# Patient Record
Sex: FEMALE | Race: WHITE
[De-identification: names, ages, dates, MRNs, and addresses within clinical notes are randomized per-mention and may not be internally consistent; named-entity substitution may affect disease eponyms.]

---

## 2017-03-07 ENCOUNTER — HOSPITAL ENCOUNTER (OUTPATIENT)
Age: 67
Discharge: HOME | End: 2017-03-07
Payer: MEDICARE

## 2017-03-07 DIAGNOSIS — E78.5: Primary | ICD-10-CM

## 2017-06-23 ENCOUNTER — HOSPITAL ENCOUNTER (OUTPATIENT)
Dept: HOSPITAL 76 - DI | Age: 67
Discharge: HOME | End: 2017-06-23
Attending: PHYSICIAN ASSISTANT
Payer: MEDICARE

## 2017-06-23 DIAGNOSIS — N95.9: Primary | ICD-10-CM

## 2017-06-23 PROCEDURE — 77080 DXA BONE DENSITY AXIAL: CPT

## 2017-06-26 NOTE — DEXA REPORT
DEXA BONE MINERAL DENSITY SCAN: 06/23/2017



CLINICAL HISTORY: A 67-year-old postmenopausal female. 



TECHNIQUE: Dual energy x-ray absorptiometry (DXA) was performed on a [employer] 
system. Regions measured are the AP spine, femoral neck, and, if needed, 
forearm.



COMPARISON: None. In accordance with the International Society for Clinical 
Densitometry (ISCD) guidelines, data from previous exams may be reanalyzed 
using current recommendations and techniques. This is done to allow a more 
accurate basis for comparison with the current study.



FINDINGS: The data for the lumbar spine is as follows:









REGION BMD (g/cm/cm) T-SCORE Z-SCORE

 

L1 1.166 0.3 1.6

 

L2 1.216 0.1 1.4

 

L3 1.343 1.2 2.4

 

L4 1.330 1.1 2.3

 

TOTAL 1.268 0.7 2.0





NOTE: All evaluable vertebrae are used for classification.



The data for the hip is as follows:







REGION BMD (g/cm/cm) T-SCORE Z-SCORE

 

Neck 0.942 -0.7 0.7

 

TOTAL 0.954 -0.4 0.7





NOTE: The femoral neck or total proximal femur, whichever is lowest, is used 
for classification.



IMPRESSION: L1 THROUGH L4 T-SCORE IS 0.7. THIS IS WITHIN NORMAL LIMITS 
ACCORDING TO THE WORLD HEALTH ORGANIZATION GUIDELINES. 



LEFT FEMORAL NECK T-SCORE IS -0.7. THIS IS WITHIN NORMAL LIMITS ACCORDING TO 
THE WORLD HEALTH ORGANIZATION GUIDELINES. 



TOTAL HIPS T-SCORE IS -0.4. THIS IS WITHIN NORMAL LIMITS ACCORDING TO THE WORLD 
HEALTH ORGANIZATION GUIDELINES.



THE WHO CLASSIFICATION BASED ON THE INTERNATIONAL REFERENCE STANDARD IS NORMAL. 
THE PATIENT HAS NO INCREASED FRACTURE RISK. 



RECOMMENDATION: Patients with diagnosis of osteoporosis or osteopenia should 
have regular bone mineral density assessment. For those eligible for Medicare, 
routine testing is allowed once every 2 years. Testing frequency can be 
increased for patients who have rapidly progressing disease or for those who 
are receiving medical therapy to restore bone mass. 



COMMENT: World Health Organization (WHO) definitions for osteoporosis and 
osteopenia:

NORMAL BMD: T-score at -1.0 or higher, fracture risk is low.

OSTEOPENIA BMD: T-score between -1.0 and -2.5, fracture risk is increased.

OSTEOPOROSIS BMD: T-score at -2.5 or lower, fracture risk high.



National Osteoporosis Foundation recommends:

1. Obtain adequate dietary calcium (at least 1200 mg per day) and vitamin D (400
-800 international units per day).

2. Participate, as appropriate, in regular weightbearing and muscle-
strengthening exercise. 

3. Avoid tobacco use and reduce alcohol and caffeine intake. 

4. For more detailed information see the website at www.NOF.org.
MTDD

## 2017-07-19 ENCOUNTER — HOSPITAL ENCOUNTER (OUTPATIENT)
Dept: HOSPITAL 76 - DI | Age: 67
Discharge: HOME | End: 2017-07-19
Attending: PHYSICIAN ASSISTANT
Payer: MEDICARE

## 2017-07-19 DIAGNOSIS — Z12.31: Primary | ICD-10-CM

## 2017-07-19 PROCEDURE — 77067 SCR MAMMO BI INCL CAD: CPT

## 2017-07-21 NOTE — MAMMOGRAPHY REPORT
DIGITAL BILATERAL SCREENING MAMMOGRAM:  07/19/2017

 

CLINICAL HISTORY:  A 67-year-old female who has no family history of breast cancer.  Patient has had 
no breast surgical history.

 

COMPARISON:  07/20/2007, 07/06/2011, 10/05/2012, 06/19/2015, 06/13/2016

 

TECHNIQUE:  Craniocaudad and oblique lateral views of each breast were obtained with [employer] Full [employer]ie
ld digital mammography.  

 

FINDINGS:  Breast parenchyma consists of scattered fibroglandular densities. 

 

No significant clusters of calcification are seen.  No significant masses are noted.  No significant 
change is seen.  Benign appearing lymph nodes are noted in each axillary region. 

 

IMPRESSION:  BREASTS APPEAR RADIOGRAPHICALLY BENIGN.

 

BIRADS CATEGORY 1 - NEGATIVE.

 

RECOMMENDATIONS:  Annual bilateral screening mammography.

 

STANDARD QUALIFYING STATEMENTS

 

1. This examination was reviewed with the aid of Computer-Aided Detection (CAD).

 

2. A negative or benign imaging report should not delay biopsy if clinically suspicious findings are 
present. Consider surgical consultation if warranted. More than 5% of cancers are not identified by i
maging.

 

3. Dense breasts may obscure an underlying neoplasm.

 

 

 

 

 

DD:07/20/2017 08:00:19  DT: 07/21/2017 07:46  JOB #: [id number]  EXT JOB #:[id number]

## 2017-12-07 ENCOUNTER — HOSPITAL ENCOUNTER (OUTPATIENT)
Dept: HOSPITAL 76 - LAB | Age: 67
Discharge: HOME | End: 2017-12-07
Attending: PHYSICIAN ASSISTANT
Payer: MEDICARE

## 2017-12-07 DIAGNOSIS — M06.9: ICD-10-CM

## 2017-12-07 DIAGNOSIS — R53.83: ICD-10-CM

## 2017-12-07 DIAGNOSIS — E78.5: Primary | ICD-10-CM

## 2017-12-07 LAB
ALBUMIN/GLOB SERPL: 1.3 {RATIO} (ref 1–2.2)
ANION GAP SERPL CALCULATED.4IONS-SCNC: 11 MMOL/L (ref 6–13)
BASOPHILS NFR BLD AUTO: 0 10^3/UL (ref 0–0.1)
BASOPHILS NFR BLD AUTO: 0.3 %
BILIRUB BLD-MCNC: 0.4 MG/DL (ref 0.2–1)
BUN SERPL-MCNC: 9 MG/DL (ref 6–20)
CALCIUM UR-MCNC: 9 MG/DL (ref 8.5–10.3)
CHLORIDE SERPL-SCNC: 103 MMOL/L (ref 101–111)
CHOLEST SERPL-MCNC: 130 MG/DL
CO2 SERPL-SCNC: 25 MMOL/L (ref 21–32)
CREAT SERPLBLD-SCNC: 0.7 MG/DL (ref 0.4–1)
EOSINOPHIL # BLD AUTO: 0 10^3/UL (ref 0–0.7)
EOSINOPHIL NFR BLD AUTO: 0 %
ERYTHROCYTE [DISTWIDTH] IN BLOOD BY AUTOMATED COUNT: 13.7 % (ref 12–15)
GFRSERPLBLD MDRD-ARVRAT: 83 ML/MIN/{1.73_M2} (ref 89–?)
GLOBULIN SER-MCNC: 3.2 G/DL (ref 2.1–4.2)
GLUCOSE SERPL-MCNC: 95 MG/DL (ref 70–100)
HCT VFR BLD AUTO: 36.9 % (ref 37–47)
HDLC SERPL-MCNC: 29 MG/DL
HDLC SERPL: 4.5 {RATIO} (ref ?–4.4)
HGB UR QL STRIP: 12.3 G/DL (ref 12–16)
LDLC/HDLC SERPL: 2.7 {RATIO} (ref ?–4.4)
LYMPHOCYTES # SPEC AUTO: 1.2 10^3/UL (ref 1.5–3.5)
LYMPHOCYTES NFR BLD AUTO: 35.9 %
MCH RBC QN AUTO: 30.3 PG (ref 27–31)
MCHC RBC AUTO-ENTMCNC: 33.4 G/DL (ref 32–36)
MCV RBC AUTO: 91 FL (ref 81–99)
MONOCYTES # BLD AUTO: 0.5 10^3/UL (ref 0–1)
MONOCYTES NFR BLD AUTO: 15.3 %
NEUTROPHILS # BLD AUTO: 1.6 10^3/UL (ref 1.5–6.6)
NEUTROPHILS # SNV AUTO: 3.3 X10^3/UL (ref 4.8–10.8)
NEUTROPHILS NFR BLD AUTO: 48.5 %
NRBC # BLD AUTO: 0.1 /100WBC
PDW BLD AUTO: 7.3 FL (ref 7.9–10.8)
POTASSIUM SERPL-SCNC: 3.8 MMOL/L (ref 3.5–5)
PROT SPEC-MCNC: 7.3 G/DL (ref 6.7–8.2)
RBC MAR: 4.06 10^6/UL (ref 4.2–5.4)
SODIUM SERPLBLD-SCNC: 139 MMOL/L (ref 135–145)
TRIGL P FAST SERPL-MCNC: 113 MG/DL
TSH SERPL-ACNC: 4.59 UIU/ML (ref 0.34–5.6)
VIT B12 SERPL-MCNC: 987 PG/ML (ref 180–914)
VLDLC SERPL-SCNC: 23 MG/DL
WBC # BLD: 3.3 X10^3/UL

## 2017-12-07 PROCEDURE — 85379 FIBRIN DEGRADATION QUANT: CPT

## 2017-12-07 PROCEDURE — 82306 VITAMIN D 25 HYDROXY: CPT

## 2017-12-07 PROCEDURE — 80061 LIPID PANEL: CPT

## 2017-12-07 PROCEDURE — 36415 COLL VENOUS BLD VENIPUNCTURE: CPT

## 2017-12-07 PROCEDURE — 82607 VITAMIN B-12: CPT

## 2017-12-07 PROCEDURE — 84443 ASSAY THYROID STIM HORMONE: CPT

## 2017-12-07 PROCEDURE — 87798 DETECT AGENT NOS DNA AMP: CPT

## 2017-12-07 PROCEDURE — 80053 COMPREHEN METABOLIC PANEL: CPT

## 2017-12-07 PROCEDURE — 85025 COMPLETE CBC W/AUTO DIFF WBC: CPT

## 2018-09-10 ENCOUNTER — HOSPITAL ENCOUNTER (OUTPATIENT)
Dept: HOSPITAL 76 - DI | Age: 68
Discharge: HOME | End: 2018-09-10
Attending: RADIOLOGY
Payer: MEDICARE

## 2018-09-10 DIAGNOSIS — Z12.31: Primary | ICD-10-CM

## 2018-09-10 PROCEDURE — 77067 SCR MAMMO BI INCL CAD: CPT

## 2018-09-12 NOTE — MAMMOGRAPHY REPORT
Reason:  SCREENING MAMMO

Procedure Date:  09/10/2018   

Accession Number:  [id number] / [id number]                    

Procedure:  JESSICA - Screening Mammo Dig Bilat CPT Code:  

 

FULL RESULT:

 

 

EXAM: Screening Mammo Dig Bilat

 

DATE: 9/10/2018 10:34 AM

 

CLINICAL HISTORY: Screening mammogram

 

TECHNIQUE: Bilateral CC and MLO views were obtained.

 

COMPARISON: Mammogram 7/19/2017

 

FINDINGS:

The breast parenchyma is heterogeneously dense which may limit the 

sensitivity of mammography. There are benign-appearing calcifications and 

lymph nodes. No suspicious masses, clustered microcalcifications, or 

regions of architectural distortion are identified.

 

IMPRESSION: Benign findings

 

RECOMMENDATION: Routine annual screening unless otherwise clinically 

indicated.

 

BIRADS CATEGORY 2: Benign findings

 

STANDARD QUALIFYING STATEMENTS:

1.  This examination was reviewed with the aid of Computer-Aided 

Detection (CAD).

2.  A negative or benign  imaging report should not delay biopsy if 

clinically suspicious findings are present.  Consider surgical 

consultation if warrented.  More than 5% of cancers are not identified by 

imaging.

3.  Dense breasts may obscure an underlying neoplasm.

## 2019-02-19 ENCOUNTER — HOSPITAL ENCOUNTER (OUTPATIENT)
Dept: HOSPITAL 76 - LAB.WCP | Age: 69
Discharge: HOME | End: 2019-02-19
Attending: PHYSICIAN ASSISTANT
Payer: MEDICARE

## 2019-02-19 DIAGNOSIS — E78.5: Primary | ICD-10-CM

## 2019-02-19 LAB
ALBUMIN DIAFP-MCNC: 4.7 G/DL (ref 3.2–5.5)
ALBUMIN/GLOB SERPL: 1.5 {RATIO} (ref 1–2.2)
ALP SERPL-CCNC: 76 IU/L (ref 42–121)
ALT SERPL W P-5'-P-CCNC: 22 IU/L (ref 10–60)
ANION GAP SERPL CALCULATED.4IONS-SCNC: 10 MMOL/L (ref 6–13)
AST SERPL W P-5'-P-CCNC: 24 IU/L (ref 10–42)
BASOPHILS NFR BLD AUTO: 0 10^3/UL (ref 0–0.1)
BASOPHILS NFR BLD AUTO: 0.5 %
BILIRUB BLD-MCNC: 0.7 MG/DL (ref 0.2–1)
BUN SERPL-MCNC: 15 MG/DL (ref 6–20)
CALCIUM UR-MCNC: 9.3 MG/DL (ref 8.5–10.3)
CHLORIDE SERPL-SCNC: 99 MMOL/L (ref 101–111)
CHOLEST SERPL-MCNC: 198 MG/DL
CO2 SERPL-SCNC: 28 MMOL/L (ref 21–32)
CREAT SERPLBLD-SCNC: 0.7 MG/DL (ref 0.4–1)
EOSINOPHIL # BLD AUTO: 0.1 10^3/UL (ref 0–0.7)
EOSINOPHIL NFR BLD AUTO: 2.2 %
ERYTHROCYTE [DISTWIDTH] IN BLOOD BY AUTOMATED COUNT: 13.5 % (ref 12–15)
GFRSERPLBLD MDRD-ARVRAT: 83 ML/MIN/{1.73_M2} (ref 89–?)
GLOBULIN SER-MCNC: 3.2 G/DL (ref 2.1–4.2)
GLUCOSE SERPL-MCNC: 112 MG/DL (ref 70–100)
HDLC SERPL-MCNC: 69 MG/DL
HDLC SERPL: 2.9 {RATIO} (ref ?–4.4)
HGB UR QL STRIP: 14.2 G/DL (ref 12–16)
LDLC SERPL CALC-MCNC: 110 MG/DL
LDLC/HDLC SERPL: 1.6 {RATIO} (ref ?–4.4)
LYMPHOCYTES # SPEC AUTO: 1.6 10^3/UL (ref 1.5–3.5)
LYMPHOCYTES NFR BLD AUTO: 26.1 %
MCH RBC QN AUTO: 31.2 PG (ref 27–31)
MCHC RBC AUTO-ENTMCNC: 33.1 G/DL (ref 32–36)
MCV RBC AUTO: 94.3 FL (ref 81–99)
MONOCYTES # BLD AUTO: 0.5 10^3/UL (ref 0–1)
MONOCYTES NFR BLD AUTO: 7.3 %
NEUTROPHILS # BLD AUTO: 4 10^3/UL (ref 1.5–6.6)
NEUTROPHILS # SNV AUTO: 6.3 X10^3/UL (ref 4.8–10.8)
NEUTROPHILS NFR BLD AUTO: 63.9 %
PDW BLD AUTO: 7.4 FL (ref 7.9–10.8)
PLATELET # BLD: 334 10^3/UL (ref 130–450)
PROT SPEC-MCNC: 7.9 G/DL (ref 6.7–8.2)
RBC MAR: 4.55 10^6/UL (ref 4.2–5.4)
SODIUM SERPLBLD-SCNC: 137 MMOL/L (ref 135–145)
VLDLC SERPL-SCNC: 19 MG/DL

## 2019-02-19 PROCEDURE — 80053 COMPREHEN METABOLIC PANEL: CPT

## 2019-02-19 PROCEDURE — 80061 LIPID PANEL: CPT

## 2019-02-19 PROCEDURE — 36415 COLL VENOUS BLD VENIPUNCTURE: CPT

## 2019-02-19 PROCEDURE — 83721 ASSAY OF BLOOD LIPOPROTEIN: CPT

## 2019-02-19 PROCEDURE — 85025 COMPLETE CBC W/AUTO DIFF WBC: CPT

## 2019-06-27 ENCOUNTER — HOSPITAL ENCOUNTER (OUTPATIENT)
Dept: HOSPITAL 76 - DI | Age: 69
Discharge: HOME | End: 2019-06-27
Attending: PHYSICIAN ASSISTANT
Payer: MEDICARE

## 2019-06-27 DIAGNOSIS — M89.9: Primary | ICD-10-CM

## 2019-06-27 PROCEDURE — 77080 DXA BONE DENSITY AXIAL: CPT

## 2019-06-28 NOTE — DEXA REPORT
Reason:  DISORDER OF BONE,UNSPECIFIED

Procedure Date:  06/27/2019   

Accession Number:  [id number] / [id number]                    

Procedure:  DEX - Dexa Spine and/or Hip CPT Code:  

 

FULL RESULT:

 

 

EXAM: Dexa Spine and/or Hip

 

DATE: 6/27/2019 9:00 AM

 

CLINICAL HISTORY: DISORDER OF BONE,UNSPECIFIED

 

TECHNIQUE: Dual energy x-ray absorptiometry (DXA) was performed on a [employer] System.  Regions measured are the AP Spine, femoral neck, and if 

needed forearm.

 

COMPARISON: 6/23/2017.

 

In accordance with the International Society for Clinical Densitometry 

(ISCD) guidelines, data from previous exams may be reanalyzed using 

current recommendations and techniques.  This is done to allow a more 

accurate basis for comparison with the current study.

 

FINDINGS: The data for the lumbar spine is as follows:

 

                 BMD (g/cm/cm)         T-SCORE          Z-SCORE

 REGION

 L1                   1.170                   0.3                   1.7

 L2                   1.220                   0.2                   1.5

 L3                   1.459                   2.2                   3.5

 L4                   1.507                   2.6                   3.9

TOTAL              1.345                   1.4                 2.7

 

NOTE: All evaluable vertebrae are used for classification

 

The data for the hip is as follows:

 

                 BMD (g/cm/cm)         T-SCORE          Z-SCORE

 REGION

 Neck             0.924                       -0.8              0.7

TOTAL            0.942                       -0.5            0.7

 

NOTE: The femoral neck or total proximal femur, whichever is lowest, is 

used for classification.

 

DXA RESULTS SUMMARY: Spine

 

SCAN DATE     AGE        BMD      CHANGE VS           CHANGE VS

                                                PREVIOUS       PREVIOUS %

4/27[age]      1.345         0.077*            6.1*

06[age]      1.268

 

 

* Denotes significant change at the 95% confidence level.

** Denotes dissimilar scan types or analysis methods.

 

DXA RESULTS SUMMARY: Hip

 

SCAN DATE     AGE        BMD      CHANGE VS           CHANGE VS

                                                PREVIOUS       PREVIOUS %

4/[birth year][age]       0.942         -0.012            -1.3

[age]       0.954

 

* Denotes significant change at the 95% confidence level.

** Denotes dissimilar scan types or analysis methods.

 

IMPRESSION: THE WHO CLASSIFICATION BASED ON THE INTERNATIONAL REFERENCE 

STANDARD IS NORMAL.  THE FRACTURE RISK IS NOT INCREASED.

 

RECOMMENDATION: Patients with diagnosis of osteoporosis or osteopenia 

should have regular bone mineral density assessment.  For those eligible 

for Medicare, routine testing is allowed once every 2 years.  Testing 

frequency can be increased for patients who have rapidly progressing 

disease or for those who are receiving medical therapy to restore bone 

mass.

 

COMMENT:  World Health Organization (WHO) definitions for osteoporosis 

and osteopenia:

NORMAL BMD:  T-score at -1.0 or higher, fracture risk is low

OSTEOPENIA BMD:  T-score between -1.0 and -2.5, fracture risk is 

increased.

OSTEOPOROSIS BMD:  T-score at -2.5 or lower, fracture risk is high.

 

National Osteoporosis Foundation recommends:

1. Obtain adequate dietary calcium (at least 1200 mg per day) and vitamin 

D (400-800 international units per day).

2. Participate, as appropriate, in regular weightbearing and 

muscle-strengthening exercise.

3. Avoid tobacco use and reduce alcohol and caffeine intake.

4. For more detailed information see the website at www.NOF.org.

## 2020-02-12 ENCOUNTER — HOSPITAL ENCOUNTER (OUTPATIENT)
Dept: HOSPITAL 76 - LAB.WCP | Age: 70
Discharge: HOME | End: 2020-02-12
Attending: NURSE PRACTITIONER
Payer: MEDICARE

## 2020-02-12 DIAGNOSIS — R73.9: Primary | ICD-10-CM

## 2020-02-12 DIAGNOSIS — E78.5: ICD-10-CM

## 2020-02-12 DIAGNOSIS — I10: ICD-10-CM

## 2020-02-12 LAB
ALBUMIN DIAFP-MCNC: 4.3 G/DL (ref 3.2–5.5)
ALBUMIN/GLOB SERPL: 1.4 {RATIO} (ref 1–2.2)
ALP SERPL-CCNC: 42 IU/L (ref 42–121)
ALT SERPL W P-5'-P-CCNC: 26 IU/L (ref 10–60)
ANION GAP SERPL CALCULATED.4IONS-SCNC: 10 MMOL/L (ref 6–13)
AST SERPL W P-5'-P-CCNC: 30 IU/L (ref 10–42)
BASOPHILS NFR BLD AUTO: 0 10^3/UL (ref 0–0.1)
BASOPHILS NFR BLD AUTO: 0.2 %
BILIRUB BLD-MCNC: 0.6 MG/DL (ref 0.2–1)
BUN SERPL-MCNC: 18 MG/DL (ref 6–20)
CALCIUM UR-MCNC: 9.3 MG/DL (ref 8.5–10.3)
CHLORIDE SERPL-SCNC: 102 MMOL/L (ref 101–111)
CHOLEST SERPL-MCNC: 134 MG/DL
CO2 SERPL-SCNC: 26 MMOL/L (ref 21–32)
CREAT SERPLBLD-SCNC: 0.9 MG/DL (ref 0.4–1)
EOSINOPHIL # BLD AUTO: 0 10^3/UL (ref 0–0.7)
EOSINOPHIL NFR BLD AUTO: 0.7 %
ERYTHROCYTE [DISTWIDTH] IN BLOOD BY AUTOMATED COUNT: 12.5 % (ref 12–15)
GFRSERPLBLD MDRD-ARVRAT: 62 ML/MIN/{1.73_M2} (ref 89–?)
GLOBULIN SER-MCNC: 3 G/DL (ref 2.1–4.2)
GLUCOSE SERPL-MCNC: 91 MG/DL (ref 70–100)
HDLC SERPL-MCNC: 34 MG/DL
HDLC SERPL: 3.9 {RATIO} (ref ?–4.4)
HGB UR QL STRIP: 13.6 G/DL (ref 12–16)
LDLC SERPL CALC-MCNC: 77 MG/DL
LDLC/HDLC SERPL: 2.3 {RATIO} (ref ?–4.4)
LYMPHOCYTES # SPEC AUTO: 1.1 10^3/UL (ref 1.5–3.5)
LYMPHOCYTES NFR BLD AUTO: 24.7 %
MCH RBC QN AUTO: 31.6 PG (ref 27–31)
MCHC RBC AUTO-ENTMCNC: 33.4 G/DL (ref 32–36)
MCV RBC AUTO: 94.4 FL (ref 81–99)
MONOCYTES # BLD AUTO: 0.7 10^3/UL (ref 0–1)
MONOCYTES NFR BLD AUTO: 16.3 %
NEUTROPHILS # BLD AUTO: 2.5 10^3/UL (ref 1.5–6.6)
NEUTROPHILS # SNV AUTO: 4.3 X10^3/UL (ref 4.8–10.8)
NEUTROPHILS NFR BLD AUTO: 57.9 %
PDW BLD AUTO: 9.9 FL (ref 7.9–10.8)
PLATELET # BLD: 262 10^3/UL (ref 130–450)
PROT SPEC-MCNC: 7.3 G/DL (ref 6.7–8.2)
RBC MAR: 4.31 10^6/UL (ref 4.2–5.4)
SODIUM SERPLBLD-SCNC: 138 MMOL/L (ref 135–145)
VLDLC SERPL-SCNC: 23 MG/DL

## 2020-02-12 PROCEDURE — 36415 COLL VENOUS BLD VENIPUNCTURE: CPT

## 2020-02-12 PROCEDURE — 84443 ASSAY THYROID STIM HORMONE: CPT

## 2020-02-12 PROCEDURE — 80061 LIPID PANEL: CPT

## 2020-02-12 PROCEDURE — 80053 COMPREHEN METABOLIC PANEL: CPT

## 2020-02-12 PROCEDURE — 85025 COMPLETE CBC W/AUTO DIFF WBC: CPT

## 2020-02-12 PROCEDURE — 83721 ASSAY OF BLOOD LIPOPROTEIN: CPT

## 2020-03-11 ENCOUNTER — HOSPITAL ENCOUNTER (OUTPATIENT)
Dept: HOSPITAL 76 - DI.WCP | Age: 70
Discharge: HOME | End: 2020-03-11
Attending: PHYSICIAN ASSISTANT
Payer: MEDICARE

## 2020-03-11 DIAGNOSIS — R05: Primary | ICD-10-CM

## 2020-03-11 PROCEDURE — 71046 X-RAY EXAM CHEST 2 VIEWS: CPT

## 2020-03-12 NOTE — XRAY REPORT
Reason:  COUGH

Procedure Date:  03/11/2020   

Accession Number:  [id number] / [id number]                    

Procedure:  WCP - Chest 2 View X-Ray CPT Code:  55928

 

***Final Report***

 

 

FULL RESULT:

 

 

EXAM:

CHEST RADIOGRAPHY

 

EXAM DATE: 3/11/2020 11:37 AM.

 

CLINICAL HISTORY: COUGH.

 

COMPARISON: CHEST 2 VIEW PA/LAT 11/21/2017 11:07 AM.

 

TECHNIQUE: 2 views.

 

FINDINGS:

Lungs/Pleura: No focal opacities evident. No pleural effusion. No 

pneumothorax. Normal volumes.

 

Mediastinum: Heart and mediastinal contours are unremarkable.

 

Other: Stable mild to moderate lower thoracic dextroscoliosis.

IMPRESSION: No acute process seen in the chest.

 

RADIA

## 2020-07-23 ENCOUNTER — HOSPITAL ENCOUNTER (OUTPATIENT)
Dept: HOSPITAL 76 - LAB.R | Age: 70
Discharge: HOME | End: 2020-07-23
Attending: PHYSICIAN ASSISTANT
Payer: MEDICARE

## 2020-07-23 DIAGNOSIS — L20.9: Primary | ICD-10-CM

## 2020-07-23 PROCEDURE — 87070 CULTURE OTHR SPECIMN AEROBIC: CPT

## 2020-07-23 PROCEDURE — 87205 SMEAR GRAM STAIN: CPT

## 2021-03-16 ENCOUNTER — HOSPITAL ENCOUNTER (OUTPATIENT)
Dept: HOSPITAL 76 - LAB.WCP | Age: 71
Discharge: HOME | End: 2021-03-16
Attending: PHYSICIAN ASSISTANT
Payer: MEDICARE

## 2021-03-16 DIAGNOSIS — E78.5: Primary | ICD-10-CM

## 2021-03-16 DIAGNOSIS — I10: ICD-10-CM

## 2021-03-16 LAB
ALBUMIN DIAFP-MCNC: 4.4 G/DL (ref 3.2–5.5)
ALBUMIN/GLOB SERPL: 1.5 {RATIO} (ref 1–2.2)
ALP SERPL-CCNC: 57 IU/L (ref 42–121)
ALT SERPL W P-5'-P-CCNC: 19 IU/L (ref 10–60)
ANION GAP SERPL CALCULATED.4IONS-SCNC: 10 MMOL/L (ref 6–13)
AST SERPL W P-5'-P-CCNC: 30 IU/L (ref 10–42)
BASOPHILS NFR BLD AUTO: 0 10^3/UL (ref 0–0.1)
BASOPHILS NFR BLD AUTO: 0.7 %
BILIRUB BLD-MCNC: 1 MG/DL (ref 0.2–1)
BUN SERPL-MCNC: 18 MG/DL (ref 6–20)
CALCIUM UR-MCNC: 9.2 MG/DL (ref 8.5–10.3)
CHLORIDE SERPL-SCNC: 104 MMOL/L (ref 101–111)
CHOLEST SERPL-MCNC: 197 MG/DL
CO2 SERPL-SCNC: 26 MMOL/L (ref 21–32)
CREAT SERPLBLD-SCNC: 0.9 MG/DL (ref 0.4–1)
EOSINOPHIL # BLD AUTO: 0.2 10^3/UL (ref 0–0.7)
EOSINOPHIL NFR BLD AUTO: 3.6 %
ERYTHROCYTE [DISTWIDTH] IN BLOOD BY AUTOMATED COUNT: 12.7 % (ref 12–15)
GFRSERPLBLD MDRD-ARVRAT: 62 ML/MIN/{1.73_M2} (ref 89–?)
GLOBULIN SER-MCNC: 3 G/DL (ref 2.1–4.2)
GLUCOSE SERPL-MCNC: 100 MG/DL (ref 70–100)
HCT VFR BLD AUTO: 39.8 % (ref 37–47)
HDLC SERPL-MCNC: 71 MG/DL
HDLC SERPL: 2.8 {RATIO} (ref ?–4.4)
HGB UR QL STRIP: 12.9 G/DL (ref 12–16)
LDLC SERPL CALC-MCNC: 109 MG/DL
LDLC/HDLC SERPL: 1.5 {RATIO} (ref ?–4.4)
LYMPHOCYTES # SPEC AUTO: 1.9 10^3/UL (ref 1.5–3.5)
LYMPHOCYTES NFR BLD AUTO: 33.5 %
MCH RBC QN AUTO: 31.6 PG (ref 27–31)
MCHC RBC AUTO-ENTMCNC: 32.4 G/DL (ref 32–36)
MCV RBC AUTO: 97.5 FL (ref 81–99)
MONOCYTES # BLD AUTO: 0.6 10^3/UL (ref 0–1)
MONOCYTES NFR BLD AUTO: 10.6 %
NEUTROPHILS # BLD AUTO: 2.9 10^3/UL (ref 1.5–6.6)
NEUTROPHILS # SNV AUTO: 5.6 X10^3/UL (ref 4.8–10.8)
NEUTROPHILS NFR BLD AUTO: 51.4 %
NRBC # BLD AUTO: 0 /100WBC
NRBC # BLD AUTO: 0 X10^3/UL
PDW BLD AUTO: 9.4 FL (ref 7.9–10.8)
PLATELET # BLD: 331 10^3/UL (ref 130–450)
POTASSIUM SERPL-SCNC: 4.9 MMOL/L (ref 3.5–5)
PROT SPEC-MCNC: 7.4 G/DL (ref 6.7–8.2)
RBC MAR: 4.08 10^6/UL (ref 4.2–5.4)
SODIUM SERPLBLD-SCNC: 140 MMOL/L (ref 135–145)
TRIGL P FAST SERPL-MCNC: 86 MG/DL
VLDLC SERPL-SCNC: 17 MG/DL

## 2021-03-16 PROCEDURE — 80061 LIPID PANEL: CPT

## 2021-03-16 PROCEDURE — 36415 COLL VENOUS BLD VENIPUNCTURE: CPT

## 2021-03-16 PROCEDURE — 80053 COMPREHEN METABOLIC PANEL: CPT

## 2021-03-16 PROCEDURE — 83721 ASSAY OF BLOOD LIPOPROTEIN: CPT

## 2021-03-16 PROCEDURE — 85025 COMPLETE CBC W/AUTO DIFF WBC: CPT

## 2021-03-26 ENCOUNTER — HOSPITAL ENCOUNTER (OUTPATIENT)
Dept: HOSPITAL 76 - DI.N | Age: 71
Discharge: HOME | End: 2021-03-26
Attending: PHYSICIAN ASSISTANT
Payer: MEDICARE

## 2021-03-26 DIAGNOSIS — M51.34: Primary | ICD-10-CM

## 2021-03-26 NOTE — XRAY REPORT
PROCEDURE:  Thoracic Spine 3 View

 

INDICATIONS:  THORACIC BACK PAIN

 

TECHNIQUE:  4 views of the thoracic spine were acquired.  

 

COMPARISON:  None.

 

FINDINGS:  

 

Bones:  No fractures or dislocations.  No suspicious bony lesions. Mild to moderate rightward scolios
is of lower thoracic spine centered at T9-T10 level is seen. Degenerative endplate changes are noted 
throughout mid to lower thoracic spine. 12 pairs of ribs are noted, and appear intact where visualize
d.  

 

Soft tissues:  No paravertebral stripe thickening.  

 

IMPRESSION:  

Mild to moderate dextroscoliosis of lower thoracic spine centered at T9-10 level. Degenerative disc d
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 No acute compression fracture or spondylolisthesis.

 

Reviewed by: Germán García MD on 3/26/2021 12:34 PM PDT

Approved by: Germán García MD on 3/26/2021 12:34 PM PDT

 

 

Station ID:  [id number]

## 2021-04-07 ENCOUNTER — HOSPITAL ENCOUNTER (OUTPATIENT)
Dept: HOSPITAL 76 - DI.N | Age: 71
Discharge: HOME | End: 2021-04-07
Attending: GENERAL ACUTE CARE HOSPITAL
Payer: MEDICARE

## 2021-04-07 DIAGNOSIS — Z12.31: Primary | ICD-10-CM

## 2021-04-08 NOTE — MAMMOGRAPHY REPORT
BILATERAL DIGITAL SCREENING MAMMOGRAM 3D/2D: 4/7/2021

 

CLINICAL: Routine screening.  

 

Comparison is made to exams dated:  9/10/2018 mammogram, 7/19/2017 mammogram, 6/13/2016 mammogram, 6/
16/2015 mammogram - [facility], and 10/5/2012 mammogram - [facility].
  The tissue of both breasts is predominantly fatty.  

 

No significant masses, calcifications, or other findings are seen in either breast.  

There has been no significant interval change.

 

IMPRESSION: NEGATIVE

There is no mammographic evidence of malignancy. A 1 year screening mammogram is recommended.  

 

 

This exam was interpreted at Station ID: [id number].  

 

NOTE: For mammograms, a report in lay terms will be sent to the patient. Approximately 15% of breast 
malignancies will not be visualized mammographically. In the management of a palpable breast mass, a 
negative mammogram must not discourage biopsy of a clinically suspicious lesion.

 

Electronically Signed By: Prince Dunn          

acr/penrad:4/7/2021 11:44:39  

 

 

 

ACR BI-RADS Category 1: Negative 3341F

PARENCHYMAL PATTERN: (F) - The breast(s) demonstrate(s) diffuse fatty replacement.

BI-RADS CATEGORY: (1) - 1

RECOMMENDATION: (ANNUAL)  - Recommend routine annual screening mammography.

[id number]

1 year screening

LATERALITY: (B)

## 2023-05-30 ENCOUNTER — HOSPITAL ENCOUNTER (OUTPATIENT)
Dept: HOSPITAL 76 - DI.N | Age: 73
Discharge: HOME | End: 2023-05-30
Attending: GENERAL ACUTE CARE HOSPITAL
Payer: MEDICARE

## 2023-05-30 DIAGNOSIS — Z12.31: Primary | ICD-10-CM

## 2023-05-31 NOTE — MAMMOGRAPHY REPORT
BILATERAL DIGITAL SCREENING MAMMOGRAM 3D/2D: 5/30/2023

 

CLINICAL: Routine screening.  

 

Comparison is made to exams dated:  4/7/2021 mammogram, 9/10/2018 mammogram, 7/19/2017 mammogram, 6/1
3/2016 mammogram, 6/16/2015 mammogram - [facility], and 10/5/2012 mammogram - [facility].  

 

There are scattered areas of fibroglandular density in both breasts (category b / 25%-50% glandular t
issue).  

 

No significant masses, calcifications, or other findings are seen in either breast.  

There has been no significant interval change.

 

IMPRESSION: NEGATIVE

There is no mammographic evidence of malignancy. A 1 year screening mammogram is recommended.  

 

Based on the Tyrer Cuzick model (a risk assessment model) the patients lifetime risk is 4.1% and her
 10 year risk is 3.6%. According to the ACR, ACS, and NCCN guidelines, an annual breast MRI exam sanchez
g with mammogram is recommended if the patients lifetime risk is 20% or greater.

 

 

This exam was interpreted at Station ID: [id number].  

 

NOTE: For mammograms, a report in lay terms will be sent to the patient. Approximately 15% of breast 
malignancies will not be visualized mammographically. In the management of a palpable breast mass, a 
negative mammogram must not discourage biopsy of a clinically suspicious lesion.

 

Electronically Signed By: Mariano hedrick/joelle:5/30/2023 13:46:04  

 

 

letter sent: No_Letter  

ACR BI-RADS Category 1: Negative 3341F

PARENCHYMAL PATTERN: (A) - The breast(s) demonstrate(s) scattered fibroglandular densities.

BI-RADS CATEGORY: (1) - 1

Mammogram

[id number]

1 year screening

LATERALITY: (B)

## 2023-12-06 ENCOUNTER — HOSPITAL ENCOUNTER (OUTPATIENT)
Dept: HOSPITAL 76 - DI.N | Age: 73
Discharge: HOME | End: 2023-12-06
Attending: FAMILY MEDICINE
Payer: MEDICARE

## 2023-12-06 DIAGNOSIS — J20.9: Primary | ICD-10-CM

## 2023-12-06 NOTE — XRAY REPORT
PROCEDURE:  Chest 2 View X-Ray

 

INDICATIONS:  ACUTE BRONCHITIS

 

TECHNIQUE:  2 views of the chest were acquired.  

 

COMPARISON:  3/11/2020.

 

FINDINGS:  

 

Surgical changes and devices:  None.  

 

Lungs and pleura:  No pleural effusions or pneumothorax.  Lungs are clear.  

 

Mediastinum:  Mediastinal contours appear normal.  Heart size is normal.  

 

Bones and chest wall:  No suspicious bony lesions.  Overlying soft tissues appear unremarkable.  

 

 

IMPRESSION:  

 

No acute cardiopulmonary process.

 

 

 

Reviewed by: Sanjiv Conklin MD on 12/6/2023 2:55 PM PST

Approved by: Sanjiv Conklin MD on 12/6/2023 2:55 PM PST

 

 

Station ID:  [id number]
[id number]

## 2024-02-01 ENCOUNTER — HOSPITAL ENCOUNTER (OUTPATIENT)
Dept: HOSPITAL 76 - LAB.N | Age: 74
Discharge: HOME | End: 2024-02-01
Attending: PHYSICIAN ASSISTANT
Payer: MEDICARE

## 2024-02-01 DIAGNOSIS — E78.5: Primary | ICD-10-CM

## 2024-02-01 DIAGNOSIS — I10: ICD-10-CM

## 2024-02-01 LAB
ALBUMIN DIAFP-MCNC: 4.5 G/DL (ref 3.2–5.5)
ALBUMIN/GLOB SERPL: 1.7 {RATIO} (ref 1–2.2)
ALP SERPL-CCNC: 58 IU/L (ref 42–121)
ALT SERPL W P-5'-P-CCNC: 18 IU/L (ref 10–60)
ANION GAP SERPL CALCULATED.4IONS-SCNC: 4 MMOL/L (ref 6–13)
AST SERPL W P-5'-P-CCNC: 20 IU/L (ref 10–42)
BASOPHILS NFR BLD AUTO: 0 10^3/UL (ref 0–0.1)
BASOPHILS NFR BLD AUTO: 0.3 %
BILIRUB BLD-MCNC: 0.5 MG/DL (ref 0.2–1)
BUN SERPL-MCNC: 14 MG/DL (ref 6–20)
CALCIUM UR-MCNC: 9.6 MG/DL (ref 8.5–10.3)
CHLORIDE SERPL-SCNC: 107 MMOL/L (ref 101–111)
CHOLEST SERPL-MCNC: 173 MG/DL
CO2 SERPL-SCNC: 30 MMOL/L (ref 21–32)
CREAT SERPLBLD-SCNC: 0.8 MG/DL (ref 0.6–1.3)
EOSINOPHIL # BLD AUTO: 0.2 10^3/UL (ref 0–0.7)
EOSINOPHIL NFR BLD AUTO: 3.1 %
ERYTHROCYTE [DISTWIDTH] IN BLOOD BY AUTOMATED COUNT: 13.2 % (ref 12–15)
GFRSERPLBLD MDRD-ARVRAT: 70 ML/MIN/{1.73_M2} (ref 89–?)
GLOBULIN SER-MCNC: 2.7 G/DL (ref 2.1–4.2)
GLUCOSE SERPL-MCNC: 101 MG/DL (ref 74–104)
HCT VFR BLD AUTO: 42 % (ref 37–47)
HDLC SERPL-MCNC: 59 MG/DL
HDLC SERPL: 2.9 {RATIO} (ref ?–4.4)
HGB UR QL STRIP: 13.1 G/DL (ref 12–16)
LDLC SERPL CALC-MCNC: 94 MG/DL
LDLC/HDLC SERPL: 1.6 {RATIO} (ref ?–4.4)
LYMPHOCYTES # SPEC AUTO: 1.4 10^3/UL (ref 1.5–3.5)
LYMPHOCYTES NFR BLD AUTO: 23.4 %
MCH RBC QN AUTO: 30.1 PG (ref 27–31)
MCHC RBC AUTO-ENTMCNC: 31.2 G/DL (ref 32–36)
MCV RBC AUTO: 96.6 FL (ref 81–99)
MONOCYTES # BLD AUTO: 0.5 10^3/UL (ref 0–1)
MONOCYTES NFR BLD AUTO: 9.1 %
NEUTROPHILS # BLD AUTO: 3.7 10^3/UL (ref 1.5–6.6)
NEUTROPHILS # SNV AUTO: 5.9 X10^3/UL (ref 4.8–10.8)
NEUTROPHILS NFR BLD AUTO: 63.8 %
NRBC # BLD AUTO: 0 /100WBC
NRBC # BLD AUTO: 0 X10^3/UL
PDW BLD AUTO: 9.3 FL (ref 7.9–10.8)
PLATELET # BLD: 352 10^3/UL (ref 130–450)
POTASSIUM SERPL-SCNC: 4.6 MMOL/L (ref 3.5–4.5)
PROT SPEC-MCNC: 7.2 G/DL (ref 6.4–8.9)
RBC MAR: 4.35 10^6/UL (ref 4.2–5.4)
SODIUM SERPLBLD-SCNC: 141 MMOL/L (ref 135–145)
TRIGL P FAST SERPL-MCNC: 102 MG/DL (ref 48–352)
VLDLC SERPL-SCNC: 20 MG/DL

## 2024-02-01 PROCEDURE — 36415 COLL VENOUS BLD VENIPUNCTURE: CPT

## 2024-02-01 PROCEDURE — 80061 LIPID PANEL: CPT

## 2024-02-01 PROCEDURE — 80053 COMPREHEN METABOLIC PANEL: CPT

## 2024-02-01 PROCEDURE — 85025 COMPLETE CBC W/AUTO DIFF WBC: CPT

## 2024-02-01 PROCEDURE — 83721 ASSAY OF BLOOD LIPOPROTEIN: CPT

## 2024-09-04 ENCOUNTER — HOSPITAL ENCOUNTER (OUTPATIENT)
Dept: HOSPITAL 76 - DI | Age: 74
Discharge: HOME | End: 2024-09-04
Attending: PHYSICIAN ASSISTANT
Payer: MEDICARE

## 2024-09-04 DIAGNOSIS — Z13.820: Primary | ICD-10-CM

## 2024-09-04 DIAGNOSIS — Z78.0: ICD-10-CM

## 2024-09-04 NOTE — DEXA REPORT
PROCEDURE: Dexa Spine and/or Hip

 

INDICATIONS: POST MENOPAUSAL

 

TECHNIQUE: Dual energy x-ray absorptiometry (DXA) was performed on a [employer] System.  Regions measur
ed are the AP Spine, femoral neck, and if needed forearm.

 

COMPARISON: 

June 27, 2019

  

FINDINGS:

 

Lumbar Spine (L1 and L2): 

Bone Mineral Density: 1.2-1 g/cm/cm,T score:  0. There has been no statistically significant change i
n bone mineral density since the prior study.

 

Left Femoral Neck:

Bone Mineral Density: 0.944 g/cm/cm, T score: -0.7. 

 

Left Hip:

Bone Mineral Density: 0.944 g/cm/cm,T score: -0.5. There has been no statistically significant change
 in bone mineral density since the prior study.

 

(T score greater or equal to -1.0: NORMAL)

(T score from -1.1 to -2.4: OSTEOPENIA)

(T score less than or equal to -2.5 to: OSTEOPOROSIS)

 

Impression: 

By WHO criteria, this patient has normal bone density. 

 

No statistical interval change in bone mineral density of the lumbar spine. No statistical interval c
hange in bone mineral density of the hip.

 

Patients with diagnosis of osteoporosis or osteopenia should have regular bone mineral density assess
ment.  For those eligible for Medicare, routine testing is allowed once every 2 years.  Testing frequ
ency can be increased for patients who have rapidly progressing disease or for those who are receivin
g medical therapy to restore bone mass.

 

Reviewed by: Ariana Peterson MD on 9/4/2024 5:25 PM NUNU

Approved by: Ariana Peterson MD on 9/4/2024 5:25 PM [email or web address]

 

 

Station ID:  [id number]